# Patient Record
Sex: FEMALE | Race: WHITE | NOT HISPANIC OR LATINO | Employment: FULL TIME | URBAN - METROPOLITAN AREA
[De-identification: names, ages, dates, MRNs, and addresses within clinical notes are randomized per-mention and may not be internally consistent; named-entity substitution may affect disease eponyms.]

---

## 2023-11-16 ENCOUNTER — CLINICAL SUPPORT (OUTPATIENT)
Dept: PEDIATRICS | Facility: CLINIC | Age: 53
End: 2023-11-16
Payer: COMMERCIAL

## 2023-11-16 DIAGNOSIS — Z23 ENCOUNTER FOR IMMUNIZATION: ICD-10-CM

## 2023-11-16 PROCEDURE — 90686 IIV4 VACC NO PRSV 0.5 ML IM: CPT | Performed by: PEDIATRICS

## 2023-11-16 PROCEDURE — 91320 SARSCV2 VAC 30MCG TRS-SUC IM: CPT | Performed by: PEDIATRICS

## 2023-11-16 PROCEDURE — 90480 ADMN SARSCOV2 VAC 1/ONLY CMP: CPT | Performed by: PEDIATRICS

## 2023-11-16 PROCEDURE — 90471 IMMUNIZATION ADMIN: CPT | Performed by: PEDIATRICS

## 2023-11-16 NOTE — PROGRESS NOTES
Has the patient already received the influenza vaccine this season?  NO    Is the patient LESS than 6 months in age?  NO    Does the patient have an allergy to the influenza vaccine?  NO    Has the patient received a solid organ transplant in the past 3 months?  NO    Has the patient had anaphylaxis to gelatin or eggs?  NO    Does the patient have an allergy to Gentamicin?  NO    Has the patient been diagnosed with Guillain-[place] within 6 weeks after a previous flu vaccine?  NO

## 2023-12-27 NOTE — PROGRESS NOTES
Kenyetta Diaz female   1970 53 y.o.   [id number]           HPI  Kenyetta Diaz is a 53 y.o.   referred to the high risk breast clinic by Linwood Thibodeaux. She has been followed short term for calcifications now deemed stable. She denies breast surgery or biopsy. 2019 genetics tested negative.     She is dealing with incontinence issue and considering surgery vs. PT. She has one episode of PMB and has call out to Dr Esme Bell ([facility]).      She has family history of breast cancer in mother age 44, uterine cancer age 70,  of breast mets age 75. Her mother took KETAN while pregnant with her. Her maternal aunt had ovarian cancer age 36, BRCA negative. Her brother tested BRCA negative. There is no Ashkenazi Scientology ancestry.       BREAST IMAGIN2022 screening mammogram BI-RADS Category 1. 2019 fast MRI BI-RADS Category 1.      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   HENT:  Negative for ear pain, hearing loss, nosebleeds, sore throat and trouble swallowing.    Eyes:  Negative for discharge, itching and visual disturbance.   Respiratory:  Negative for cough, chest tightness and shortness of breath.    Cardiovascular:  Negative for chest pain, palpitations and leg swelling.   Breast: as indicated in HPI  Gastrointestinal:  Negative for abdominal pain, constipation, diarrhea and nausea.   Endocrine: Negative for cold intolerance and heat  intolerance.   Genitourinary:  Negative for dysuria, frequency, hematuria, pelvic pain and vaginal bleeding.   Musculoskeletal:  Negative for arthralgias, back pain, gait problem, joint swelling and myalgias.   Skin:  Negative for color change and rash.   Allergic/Immunologic: Negative for environmental allergies and food allergies.   Neurological:  Negative for dizziness, tremors, speech difficulty, weakness, numbness and headaches.   Hematological:  Does not bruise/bleed easily.   Psychiatric/Behavioral:  Negative for agitation, dysphoric mood and sleep disturbance. The patient is not nervous/anxious.         MEDICATIONS  Current Outpatient Medications   Medication Instructions    docusate sodium (COLACE ORAL) oral    fluticasone (Flonase) 50 mcg/actuation nasal spray 1 spray, Each Nostril, Daily, Shake gently. Before first use, prime pump. After use, clean tip and replace cap.    fluticasone propion/salmeterol (ADVAIR HFA INHL) inhalation    polyethylene glycol (GLYCOLAX, MIRALAX) 17 g, oral, Daily        ALLERGIES  Allergies   Allergen Reactions    Metrogel [Metronidazole] Itching        SOCIAL HISTORY    No family history on file.      Social History     Tobacco Use    Smoking status: Never    Smokeless tobacco: Never   Substance Use Topics    Alcohol use: Not on file        VITALS  Vitals:    01/02/24 0840   BP: 113/74   Pulse: 66        PHYSICAL EXAM  Patient is alert and oriented x3, with appropriate mood. The gait is steady and hand grasps are equal. Sclera clear. The breasts are nearly symmetrical. The tissue is soft without palpable abnormalities, discrete nodules or masses. The skin and nipples appear normal. There is no cervical, supraclavicular, or axillary lymphadenopathy palpable. Heart rate and rhythm normal, S1 and S2 appreciated. The lungs are clear bilaterally. Abdomen is soft & non-tender.    Physical Exam     IMAGING      Time was spent viewing digital images of the radiology testing with the  patient. I explained the results in depth, along with suggested explanation for follow up recommendations based on the testing results.          ORDERS  Orders Placed This Encounter   Procedures    BI mammo bilateral screening tomosynthesis     Standing Status:   Future     Standing Expiration Date:   2/27/2025     Order Specific Question:   Perform a breast ultrasound if clinically indicated by Radiologist?     Answer:   Yes     Order Specific Question:   Previous Mamm performed at  location?     Answer:   Yes     Order Specific Question:   Reason for exam:     Answer:   high risk     Order Specific Question:   Radiologist to Determine Optimal Study     Answer:   Yes     Order Specific Question:   Release result to [employer]     Answer:   Immediate     Order Specific Question:   Is this exam part of a Research Study? If Yes, link this order to the research study     Answer:   No     Order Specific Question:   Is the patient pregnant?     Answer:   No    MR breast bilateral w IV contrast fast screening self pay     Standing Status:   Future     Standing Expiration Date:   1/2/2025     Order Specific Question:   Reason for exam:     Answer:   high risk     Order Specific Question:   Is the patient pregnant or breast feeding?     Answer:   No     Order Specific Question:   What is the patient's sedation requirement?     Answer:   No Sedation     Order Specific Question:   Does the patient have a Cochlear Implant, Pacemaker, Defibrillator, Pacing Wire, Brain Aneurysm Clip, Implanted Nerve or Bone Graft Simulator, Implanted Breast Tissue Expander, Glucose Monitor or Neulasta Device?     Answer:   No     Order Specific Question:   Radiologist to Determine Optimal Study     Answer:   No     Order Specific Question:   Release result to [employer]     Answer:   Immediate           ASSESSMENT/PLAN  1. Breast cancer screening, high risk patient  Clinic Appointment Request    MR breast bilateral w IV contrast fast screening self  pay      2. Healthcare maintenance  BI mammo bilateral screening tomosynthesis           Follow up in about 1 year (around 1/2/2025), or with clinic screening mammogram.  HIGH RISK PLAN  Yearly mammogram with digital breast tomosynthesis  Twice yearly clinical breast examinations  Breast MRI (to schedule call [phone])  Monthly self breast examinations &/or regular self breast awareness  Vitamin D3 2000 IU/daily (over the counter) unless your PCP recommends you take a specific dose  Exercise 3-4 times per week for 45-60 minutes  Limit alcohol to 3-4 drinks per week if you are menopausal  Eat healthy low-fat diet with lots of vegetable & fruits  Risk models indicate personal risk of breast cancer in the next 5 years and lifetime (age 85-90):  Breast Cancer Risk Assessment Tool (Ngoc): 5-year risk 2% (average 1.4%), lifetime risk 15.1% (average 10.6%).   Stephanie: 5-year risk 3.2% (average 1.3%), lifetime risk 20.1%, (average 8.8%)    She is eligible for endocrine therapy with Tamoxifen, and also Raloxifene or Aromatase inhibitor if/when menopausal. Endocrine therapy reduces lifetime risk of breast cancer by 50% when taken for 5 years. There is an alternative low dose Tamoxifen which can be taken for only 3 years.       Yissel Dunbar, CORETTA-[facility]

## 2024-01-02 ENCOUNTER — ANCILLARY PROCEDURE (OUTPATIENT)
Dept: RADIOLOGY | Facility: CLINIC | Age: 54
End: 2024-01-02
Payer: COMMERCIAL

## 2024-01-02 ENCOUNTER — OFFICE VISIT (OUTPATIENT)
Dept: SURGICAL ONCOLOGY | Facility: CLINIC | Age: 54
End: 2024-01-02
Payer: COMMERCIAL

## 2024-01-02 VITALS — HEART RATE: 66 BPM | SYSTOLIC BLOOD PRESSURE: 113 MMHG | DIASTOLIC BLOOD PRESSURE: 74 MMHG

## 2024-01-02 DIAGNOSIS — Z00.00 HEALTHCARE MAINTENANCE: ICD-10-CM

## 2024-01-02 DIAGNOSIS — Z12.39 BREAST CANCER SCREENING, HIGH RISK PATIENT: Primary | ICD-10-CM

## 2024-01-02 DIAGNOSIS — Z12.39 ENCOUNTER FOR OTHER SCREENING FOR MALIGNANT NEOPLASM OF BREAST: ICD-10-CM

## 2024-01-02 PROCEDURE — 77067 SCR MAMMO BI INCL CAD: CPT | Mod: BILATERAL PROCEDURE | Performed by: RADIOLOGY

## 2024-01-02 PROCEDURE — 77067 SCR MAMMO BI INCL CAD: CPT

## 2024-01-02 PROCEDURE — 99214 OFFICE O/P EST MOD 30 MIN: CPT | Performed by: NURSE PRACTITIONER

## 2024-01-02 PROCEDURE — 77063 BREAST TOMOSYNTHESIS BI: CPT | Mod: BILATERAL PROCEDURE | Performed by: RADIOLOGY

## 2024-01-02 RX ORDER — POLYETHYLENE GLYCOL 3350 17 G/17G
17 POWDER, FOR SOLUTION ORAL DAILY
COMMUNITY

## 2024-01-02 RX ORDER — FLUTICASONE PROPIONATE 50 MCG
1 SPRAY, SUSPENSION (ML) NASAL DAILY
COMMUNITY

## 2024-01-02 ASSESSMENT — PAIN SCALES - GENERAL: PAINLEVEL: 0-NO PAIN

## 2024-01-17 PROBLEM — K58.0 IRRITABLE BOWEL SYNDROME WITH DIARRHEA: Status: ACTIVE | Noted: 2020-10-06

## 2024-01-17 PROBLEM — S09.90XA HEAD TRAUMA: Status: ACTIVE | Noted: 2024-01-17

## 2024-01-17 PROBLEM — R79.89 ABNORMAL LIVER FUNCTION TEST: Status: ACTIVE | Noted: 2022-12-29

## 2024-01-17 PROBLEM — L70.9 ACNE: Status: ACTIVE | Noted: 2024-01-17

## 2024-01-17 PROBLEM — K62.89 ANORECTAL PAIN: Status: ACTIVE | Noted: 2021-04-07

## 2024-01-17 PROBLEM — L71.9 ROSACEA: Status: ACTIVE | Noted: 2018-05-10

## 2024-01-17 PROBLEM — R22.0 NASAL SWELLING: Status: ACTIVE | Noted: 2024-01-17

## 2024-01-17 PROBLEM — H53.021 REFRACTIVE AMBLYOPIA OF RIGHT EYE: Status: ACTIVE | Noted: 2020-07-29

## 2024-01-17 PROBLEM — E78.00 PURE HYPERCHOLESTEROLEMIA: Status: ACTIVE | Noted: 2020-10-23

## 2024-01-17 PROBLEM — S05.00XA CORNEAL ABRASION: Status: ACTIVE | Noted: 2024-01-17

## 2024-01-17 PROBLEM — H17.9 CORNEAL SCARS, BOTH EYES: Status: ACTIVE | Noted: 2020-07-29

## 2024-01-17 PROBLEM — G89.29 CHRONIC ABDOMINAL PAIN: Status: ACTIVE | Noted: 2018-05-10

## 2024-01-17 PROBLEM — K64.9 HEMORRHOIDS: Status: ACTIVE | Noted: 2024-01-17

## 2024-01-17 PROBLEM — M79.10 MYALGIA: Status: ACTIVE | Noted: 2024-01-17

## 2024-01-17 PROBLEM — L98.9 INFLAMMATORY DERMATOSIS: Status: ACTIVE | Noted: 2024-01-17

## 2024-01-17 PROBLEM — H18.831 RECURRENT EROSION OF RIGHT CORNEA: Status: ACTIVE | Noted: 2020-07-29

## 2024-01-17 PROBLEM — Z86.79 HISTORY OF CARDIAC ARRHYTHMIA: Status: ACTIVE | Noted: 2024-01-17

## 2024-01-17 PROBLEM — R31.29 MICROSCOPIC HEMATURIA: Status: ACTIVE | Noted: 2024-01-17

## 2024-01-17 PROBLEM — I47.10 PAROXYSMAL SVT (SUPRAVENTRICULAR TACHYCARDIA) (CMS-HCC): Status: ACTIVE | Noted: 2024-01-17

## 2024-01-17 PROBLEM — R32 URINARY INCONTINENCE: Status: ACTIVE | Noted: 2024-01-17

## 2024-01-17 PROBLEM — K59.02 ANISMUS: Status: ACTIVE | Noted: 2024-01-17

## 2024-01-17 PROBLEM — K29.70 GASTRITIS: Status: ACTIVE | Noted: 2024-01-17

## 2024-01-17 PROBLEM — R92.30 DENSE BREAST TISSUE: Status: ACTIVE | Noted: 2024-01-17

## 2024-01-17 PROBLEM — R10.9 CHRONIC ABDOMINAL PAIN: Status: ACTIVE | Noted: 2018-05-10

## 2024-01-17 PROBLEM — L30.9 DERMATITIS: Status: ACTIVE | Noted: 2024-01-17

## 2024-01-17 PROBLEM — B35.1 ONYCHOMYCOSIS: Status: ACTIVE | Noted: 2019-06-12

## 2024-01-17 PROBLEM — E55.9 VITAMIN D DEFICIENCY: Status: ACTIVE | Noted: 2020-10-06

## 2024-01-17 PROBLEM — R76.8 ANA POSITIVE: Status: ACTIVE | Noted: 2018-05-10

## 2024-01-17 PROBLEM — N39.3 STRESS INCONTINENCE, FEMALE: Status: ACTIVE | Noted: 2018-05-23

## 2024-01-17 PROBLEM — K29.50 ANTRAL GASTRITIS: Status: ACTIVE | Noted: 2024-01-17

## 2024-01-17 PROBLEM — F41.9 ANXIETY: Status: ACTIVE | Noted: 2018-05-09

## 2024-01-17 PROBLEM — H18.332: Status: ACTIVE | Noted: 2020-07-29

## 2024-01-17 PROBLEM — R51.9 HEADACHE: Status: ACTIVE | Noted: 2024-01-17

## 2024-01-17 PROBLEM — J02.9 SORE THROAT: Status: ACTIVE | Noted: 2024-01-17

## 2024-01-17 PROBLEM — H52.223 REGULAR ASTIGMATISM OF BOTH EYES: Status: ACTIVE | Noted: 2020-07-29

## 2024-01-17 PROBLEM — J01.00 ACUTE NON-RECURRENT MAXILLARY SINUSITIS: Status: ACTIVE | Noted: 2018-03-30

## 2024-01-17 PROBLEM — M25.50 ARTHRALGIA OF MULTIPLE SITES: Status: ACTIVE | Noted: 2024-01-17

## 2024-01-17 PROBLEM — R07.89 CHEST WALL PAIN: Status: ACTIVE | Noted: 2024-01-17

## 2024-01-17 RX ORDER — METHYLPREDNISOLONE 4 MG/1
TABLET ORAL
COMMUNITY
Start: 2023-11-08

## 2024-01-17 RX ORDER — AZELASTINE 1 MG/ML
2 SPRAY, METERED NASAL 2 TIMES DAILY
COMMUNITY
Start: 2023-07-05

## 2024-01-17 RX ORDER — IPRATROPIUM BROMIDE 42 UG/1
SPRAY, METERED NASAL
COMMUNITY
Start: 2023-07-14

## 2024-01-17 RX ORDER — MONTELUKAST SODIUM 10 MG/1
10 TABLET ORAL DAILY
COMMUNITY
Start: 2023-07-26

## 2024-01-17 RX ORDER — MISOPROSTOL 200 UG/1
TABLET ORAL
COMMUNITY
Start: 2024-01-02

## 2024-01-17 RX ORDER — TRETINOIN 0.25 MG/G
CREAM TOPICAL
COMMUNITY
Start: 2024-01-03 | End: 2025-01-03

## 2024-01-17 RX ORDER — ERGOCALCIFEROL 1.25 MG/1
1 CAPSULE ORAL
COMMUNITY

## 2024-01-17 RX ORDER — ESTRADIOL 0.1 MG/G
1 CREAM VAGINAL 2 TIMES WEEKLY
COMMUNITY
Start: 2023-12-21 | End: 2025-08-07

## 2024-01-17 RX ORDER — PANTOPRAZOLE SODIUM 40 MG/1
40 TABLET, DELAYED RELEASE ORAL
COMMUNITY
Start: 2023-07-31

## 2024-01-17 RX ORDER — CHOLECALCIFEROL (VITAMIN D3) 50 MCG
TABLET ORAL EVERY 24 HOURS
COMMUNITY

## 2024-01-17 RX ORDER — BUDESONIDE 0.5 MG/2ML
INHALANT ORAL
COMMUNITY
Start: 2023-12-12

## 2024-06-03 ENCOUNTER — TELEPHONE (OUTPATIENT)
Dept: SURGICAL ONCOLOGY | Facility: CLINIC | Age: 54
End: 2024-06-03
Payer: COMMERCIAL

## 2024-06-03 ENCOUNTER — HOSPITAL ENCOUNTER (OUTPATIENT)
Dept: RADIOLOGY | Facility: HOSPITAL | Age: 54
Discharge: HOME | End: 2024-06-03

## 2024-06-03 DIAGNOSIS — Z12.39 BREAST CANCER SCREENING, HIGH RISK PATIENT: ICD-10-CM

## 2024-06-03 PROCEDURE — 6100000003 BI MR BREAST BILATERAL WITH CONTRAST FAST SCREENING SELF PAY

## 2024-06-03 PROCEDURE — 2550000001 HC RX 255 CONTRASTS: Performed by: NURSE PRACTITIONER

## 2024-06-03 PROCEDURE — A9575 INJ GADOTERATE MEGLUMI 0.1ML: HCPCS | Performed by: NURSE PRACTITIONER

## 2024-06-03 RX ORDER — GADOTERATE MEGLUMINE 376.9 MG/ML
12 INJECTION INTRAVENOUS
Status: COMPLETED | OUTPATIENT
Start: 2024-06-03 | End: 2024-06-03

## 2024-06-03 RX ADMIN — GADOTERATE MEGLUMINE 12 ML: 376.9 INJECTION INTRAVENOUS at 08:34

## 2024-09-11 ENCOUNTER — OFFICE VISIT (OUTPATIENT)
Dept: PEDIATRICS | Facility: CLINIC | Age: 54
End: 2024-09-11
Payer: COMMERCIAL

## 2024-09-11 DIAGNOSIS — Z23 ENCOUNTER FOR IMMUNIZATION: ICD-10-CM

## 2024-09-11 PROCEDURE — 90656 IIV3 VACC NO PRSV 0.5 ML IM: CPT | Performed by: PEDIATRICS

## 2024-09-11 PROCEDURE — 90471 IMMUNIZATION ADMIN: CPT | Performed by: PEDIATRICS

## 2025-01-02 NOTE — PROGRESS NOTES
Kenyetta Diaz female   1970 54 y.o.   [id number]      Chief Complaint    Follow-up          HPI  Kenyetta Diaz is a 54 y.o.   [facility]. She denies breast surgery or biopsy. 2019 genetics tested negative.     She is dealing with incontinence issue and considering surgery vs. PT. She has one episode of PMB with negative endometrial biopsy 2024 ([facility]).  She has family history of breast cancer in mother age 44, uterine cancer age 70,  of breast mets age 75. Her mother took KETAN while pregnant with her. Her maternal aunt had ovarian cancer age 36, BRCA negative. Her brother tested BRCA negative. There is no Ashkenazi Denominational ancestry.     BREAST IMAGING: 10/14/2024 screening mammogram BI-RADS Category 1. 2024 fast MRI BI-RADS Category 1.      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 +fatigue  HENT:  Negative for ear pain, hearing loss, nosebleeds, sore throat and trouble swallowing.    Eyes:  Negative for discharge, itching and visual disturbance.   Respiratory:  Negative for chest tightness and shortness of breath.  +cough  Cardiovascular:  Negative for chest pain, palpitations and leg swelling.   Breast: as indicated in HPI  Gastrointestinal:  Negative for abdominal pain, constipation, diarrhea and nausea.   Endocrine: Negative for cold intolerance and heat intolerance.    Genitourinary:  Negative for dysuria, frequency, hematuria, pelvic pain and vaginal bleeding. +incontinence  Musculoskeletal:  Negative for back pain, gait problem, joint swelling and myalgias. +joint pain  Skin:  Negative for color change and rash.   Allergic/Immunologic: Negative for environmental allergies and food allergies.   Neurological:  Negative for dizziness, tremors, speech difficulty, weakness, numbness and headaches.   Hematological:  Does not bruise/bleed easily.   Psychiatric/Behavioral:  Negative for agitation, dysphoric mood and sleep disturbance. The patient is not nervous/anxious.         MEDICATIONS  Current Outpatient Medications   Medication Instructions    azelastine (Astelin) 137 mcg (0.1 %) nasal spray 2 sprays, 2 times daily    budesonide (Pulmicort) 0.5 mg/2 mL nebulizer solution 2 MILLILITER(S) DAILY IN SINUS RINSE    cholecalciferol (Vitamin D-3) 50 MCG (2000 UT) tablet Every 24 hours    docusate sodium (COLACE ORAL) Take by mouth.    ergocalciferol (Vitamin D-2) 1.25 MG (74292 UT) capsule 1 capsule, Once Weekly    estradiol (ESTRACE) 1 g, 2 times weekly    fluticasone propion/salmeterol (ADVAIR HFA INHL) Inhale.    ipratropium (Atrovent) 42 mcg (0.06 %) nasal spray INHALE 2 PUFFS NASALLY 3 TIMES A DAY AS NEEDED    miSOPROStoL (Cytotec) 200 mcg tablet 1 tablets 2 days prior to procedure at bedtime and then take 1 tabs the day before the procedure at bedtime.    polyethylene glycol (GLYCOLAX, MIRALAX) 17 g, Daily        ALLERGIES  Allergies   Allergen Reactions    Metrogel [Metronidazole] Itching    Nitroimidazoles Itching, Rash and Unknown        SOCIAL HISTORY    Family History   Problem Relation Name Age of Onset    Breast cancer Mother 45     Other (bladder cancer) Brother      Colon cancer Maternal Grandfather      Ovarian cancer Mother's Sister 36          Social History     Tobacco Use    Smoking status: Never    Smokeless tobacco: Never   Substance Use Topics    Alcohol use: Not  on file        VITALS  Vitals:    01/03/25 0759   BP: 105/65   Pulse: 68   Temp: 36.8 °C (98.2 °F)          PHYSICAL EXAM  Patient is alert and oriented x3, with appropriate mood. The gait is steady and hand grasps are equal. Sclera clear. The breasts are nearly symmetrical. The tissue is soft without palpable abnormalities, discrete nodules or masses. The skin and nipples appear normal. There is no cervical, supraclavicular, or axillary lymphadenopathy palpable. Heart rate and rhythm normal, S1 and S2 appreciated. The lungs are clear bilaterally. Abdomen is soft & non-tender.    Physical Exam     IMAGING      Time was spent viewing digital images of the radiology testing with the patient.     RISK PROFILE      ORDERS  Orders Placed This Encounter   Procedures    BI mammo bilateral screening tomosynthesis     Standing Status:   Future     Standing Expiration Date:   3/2/2026     Order Specific Question:   Perform a breast ultrasound if clinically indicated by Radiologist?     Answer:   Yes     Order Specific Question:   Previous Mamm performed at  location?     Answer:   Yes     Order Specific Question:   Reason for exam:     Answer:   .     Order Specific Question:   Radiologist to Determine Optimal Study     Answer:   Yes     Order Specific Question:   Release result to [employer]     Answer:   Immediate     Order Specific Question:   Is this exam part of a Research Study? If Yes, link this order to the research study     Answer:   No     Order Specific Question:   Is the patient pregnant?     Answer:   No    MR breast bilateral w IV contrast fast screening self pay     Standing Status:   Future     Standing Expiration Date:   1/2/2026     Order Specific Question:   Reason for exam:     Answer:   high risk surveillance care, dense breast tissue, lifetime risk > 20%     Order Specific Question:   Is the patient pregnant or breast feeding?     Answer:   No     Order Specific Question:   Does the patient have a  Cochlear Implant, Brain Aneurysm Clip, Implanted Nerve or Bone Graft Stimulator, Implanted Breast Tissue Expander, Glucose Monitor or Neulasta Device?     Answer:   No     Order Specific Question:   Radiologist to Determine Optimal Study     Answer:   Yes     Order Specific Question:   Release result to [employer]     Answer:   Immediate     Order Specific Question:   Is this exam part of a Research Study? If Yes, link this order to the research study     Answer:   No           ASSESSMENT/PLAN  1. Encounter for screening mammogram for malignant neoplasm of breast  BI mammo bilateral screening tomosynthesis      2. Breast cancer screening, high risk patient  Clinic Appointment Request    MR breast bilateral w IV contrast fast screening self pay    Clinic Appointment Request             Follow up in about 1 year (around 1/3/2026) for with or after recommended imaging.  HIGH RISK PLAN  Yearly mammogram with digital breast tomosynthesis  Twice yearly clinical breast examinations  Breast MRI (to schedule call [phone]) May 2025  Monthly self breast examinations &/or regular self breast awareness  Vitamin D3 2000 IU/daily (over the counter) unless your PCP recommends you take a specific dose  Exercise 3-4 times per week for 45-60 minutes  Limit alcohol to 3-4 drinks per week if you are menopausal  Eat healthy low-fat diet with lots of vegetable & fruits  Risk models indicate personal risk of breast cancer in the next 5 years and lifetime (age 85-90):  Stephanie: 5-year risk 3.2% (average 1.4%), lifetime risk 19.5%, (average 8.6%)    She is eligible for endocrine therapy with Tamoxifen, and also Raloxifene or Aromatase inhibitor if/when menopausal. Endocrine therapy reduces lifetime risk of breast cancer by 50% when taken for 5 years. There is an alternative low dose Tamoxifen which can be taken for only 3 years.       Yissel Dunbar, APRN-[facility]

## 2025-01-03 ENCOUNTER — OFFICE VISIT (OUTPATIENT)
Dept: SURGICAL ONCOLOGY | Facility: CLINIC | Age: 55
End: 2025-01-03
Payer: COMMERCIAL

## 2025-01-03 ENCOUNTER — HOSPITAL ENCOUNTER (OUTPATIENT)
Dept: RADIOLOGY | Facility: CLINIC | Age: 55
Discharge: HOME | End: 2025-01-03
Payer: COMMERCIAL

## 2025-01-03 VITALS — HEIGHT: 66 IN | WEIGHT: 151.01 LBS | BODY MASS INDEX: 24.27 KG/M2

## 2025-01-03 VITALS
TEMPERATURE: 98.2 F | DIASTOLIC BLOOD PRESSURE: 65 MMHG | SYSTOLIC BLOOD PRESSURE: 105 MMHG | BODY MASS INDEX: 21.8 KG/M2 | WEIGHT: 135 LBS | HEART RATE: 68 BPM

## 2025-01-03 DIAGNOSIS — Z12.31 ENCOUNTER FOR SCREENING MAMMOGRAM FOR MALIGNANT NEOPLASM OF BREAST: Primary | ICD-10-CM

## 2025-01-03 DIAGNOSIS — Z00.00 HEALTHCARE MAINTENANCE: ICD-10-CM

## 2025-01-03 DIAGNOSIS — Z12.39 BREAST CANCER SCREENING, HIGH RISK PATIENT: ICD-10-CM

## 2025-01-03 PROCEDURE — 99214 OFFICE O/P EST MOD 30 MIN: CPT | Performed by: NURSE PRACTITIONER

## 2025-01-03 PROCEDURE — 77067 SCR MAMMO BI INCL CAD: CPT

## 2025-01-03 ASSESSMENT — PAIN SCALES - GENERAL: PAINLEVEL_OUTOF10: 0-NO PAIN

## 2025-03-03 ENCOUNTER — CLINICAL SUPPORT (OUTPATIENT)
Dept: EMERGENCY MEDICINE | Facility: HOSPITAL | Age: 55
End: 2025-03-03
Payer: COMMERCIAL

## 2025-03-03 ENCOUNTER — HOSPITAL ENCOUNTER (EMERGENCY)
Facility: HOSPITAL | Age: 55
Discharge: HOME | End: 2025-03-03
Attending: EMERGENCY MEDICINE
Payer: COMMERCIAL

## 2025-03-03 VITALS
HEIGHT: 66 IN | TEMPERATURE: 98.8 F | OXYGEN SATURATION: 98 % | WEIGHT: 135 LBS | BODY MASS INDEX: 21.69 KG/M2 | SYSTOLIC BLOOD PRESSURE: 114 MMHG | DIASTOLIC BLOOD PRESSURE: 75 MMHG | RESPIRATION RATE: 16 BRPM | HEART RATE: 82 BPM

## 2025-03-03 DIAGNOSIS — M94.0 COSTOCHONDRITIS: Primary | ICD-10-CM

## 2025-03-03 PROCEDURE — 99284 EMERGENCY DEPT VISIT MOD MDM: CPT | Performed by: EMERGENCY MEDICINE

## 2025-03-03 PROCEDURE — 2500000005 HC RX 250 GENERAL PHARMACY W/O HCPCS: Performed by: STUDENT IN AN ORGANIZED HEALTH CARE EDUCATION/TRAINING PROGRAM

## 2025-03-03 PROCEDURE — 93005 ELECTROCARDIOGRAM TRACING: CPT

## 2025-03-03 PROCEDURE — 93010 ELECTROCARDIOGRAM REPORT: CPT | Performed by: EMERGENCY MEDICINE

## 2025-03-03 PROCEDURE — 99283 EMERGENCY DEPT VISIT LOW MDM: CPT | Performed by: EMERGENCY MEDICINE

## 2025-03-03 RX ORDER — DICLOFENAC SODIUM 10 MG/G
4 GEL TOPICAL 4 TIMES DAILY
Qty: 100 G | Refills: 0 | Status: SHIPPED | OUTPATIENT
Start: 2025-03-03

## 2025-03-03 RX ORDER — FLUTICASONE PROPIONATE 50 MCG
1 SPRAY, SUSPENSION (ML) NASAL DAILY
Qty: 16 G | Refills: 0 | Status: SHIPPED | OUTPATIENT
Start: 2025-03-03 | End: 2025-04-02

## 2025-03-03 RX ORDER — CETIRIZINE HYDROCHLORIDE 10 MG/1
10 TABLET ORAL DAILY
Qty: 30 TABLET | Refills: 0 | Status: SHIPPED | OUTPATIENT
Start: 2025-03-03 | End: 2026-03-03

## 2025-03-03 RX ORDER — LIDOCAINE 50 MG/G
1 PATCH TOPICAL DAILY
Qty: 5 PATCH | Refills: 0 | Status: SHIPPED | OUTPATIENT
Start: 2025-03-03

## 2025-03-03 RX ORDER — LIDOCAINE 560 MG/1
1 PATCH PERCUTANEOUS; TOPICAL; TRANSDERMAL DAILY
Status: DISCONTINUED | OUTPATIENT
Start: 2025-03-03 | End: 2025-03-03 | Stop reason: HOSPADM

## 2025-03-03 RX ADMIN — LIDOCAINE PAIN RELIEF 1 PATCH: 560 PATCH TOPICAL at 19:24

## 2025-03-03 ASSESSMENT — COLUMBIA-SUICIDE SEVERITY RATING SCALE - C-SSRS
2. HAVE YOU ACTUALLY HAD ANY THOUGHTS OF KILLING YOURSELF?: NO
1. IN THE PAST MONTH, HAVE YOU WISHED YOU WERE DEAD OR WISHED YOU COULD GO TO SLEEP AND NOT WAKE UP?: NO
6. HAVE YOU EVER DONE ANYTHING, STARTED TO DO ANYTHING, OR PREPARED TO DO ANYTHING TO END YOUR LIFE?: NO

## 2025-03-03 ASSESSMENT — PAIN SCALES - GENERAL
PAINLEVEL_OUTOF10: 3
PAINLEVEL_OUTOF10: 8

## 2025-03-03 ASSESSMENT — PAIN - FUNCTIONAL ASSESSMENT: PAIN_FUNCTIONAL_ASSESSMENT: 0-10

## 2025-03-03 NOTE — ED TRIAGE NOTES
Patient states that she is having SOB and cough and pain when she inhales. Patient has been having cough x2 years. Patient states that sob that started today. Patient recently diagnosed with sinus infection. Patient denies PMH. Patient states that she went to a pulmonologist for chronic cough

## 2025-03-03 NOTE — ED PROVIDER NOTES
Emergency Department Provider Note        History of Present Illness     History provided by: Patient  Limitations to History: None  External Records Reviewed with Brief Summary:  Multiple outpatient notes from pulmonology, PCP, as well as radiology reviewing.  Patient has history of recurrent lung infections as well as sinusitis.  Is being referred to allergy for further evaluation.  Multiple messages today to outpatient clinic unable to make an appointment    HPI:  Kenyetta Diaz is a 54 y.o. female presenting to the emergency department for concerns of a cough and focal right rib pain.  Patient noticed she coughed very hard yesterday and since that time has had notable right rib pain.  It hurts when she breathes in deeply.  Normal breathing does not bother her as much.  It is very tender to palpation.    Physical Exam   Triage vitals:  T 37.1 °C (98.8 °F)  HR 81  /65  RR 16  O2 95 % None (Room air)    Physical Exam  Constitutional:       General: She is not in acute distress.     Appearance: Normal appearance. She is not ill-appearing.   HENT:      Head: Normocephalic and atraumatic.      Right Ear: External ear normal.      Left Ear: External ear normal.      Mouth/Throat:      Mouth: Mucous membranes are moist.   Eyes:      General:         Right eye: No discharge.         Left eye: No discharge.   Cardiovascular:      Rate and Rhythm: Normal rate.   Pulmonary:      Effort: Pulmonary effort is normal. No respiratory distress.   Musculoskeletal:         General: Tenderness (Tenderness to palpation over the right rib at approximately the level of the seventh or eighth rib.  Focal tenderness that does not spread out) present. Normal range of motion.   Skin:     General: Skin is warm and dry.      Coloration: Skin is not jaundiced.   Neurological:      Mental Status: She is alert and oriented to person, place, and time.   Psychiatric:         Mood and Affect: Mood normal.         Behavior: Behavior normal.           Medical Decision Making & ED Course   Medical Decision Makin y.o. female  Presenting as per [facility], differential is broad and includes but not limited to costochondritis, chronic rhinosinusitis, viral syndrome, muscle strain, bronchitis.   As a result, workup was considered including imaging, blood work however patient was very hemodynamically stable, and with regards to risk versus benefit there did not appear to be a clinical question that would be identified with this at the time so through shared decision making it was ultimately deferred.  Additionally after discussed with the patient, it was agreeable that patient would discharge home with new prescription for lidocaine patch which 1 was also applied in the emergency department, Voltaren gel, as well as Zyrtec.  Prescription of Flonase was offered however patient would like to continue taking her current nasal spray instead of that.  Patient will continue following up with her specialists at this time.  Additionally patient given incentive spirometer and instructed to utilize it to prevent development of pneumonia due to pain on deep respiration to which patient was agreeable.  ----    Differential diagnoses considered include but are not limited to: As per [facility]    Chronic conditions affecting the patient's care: As documented above in [facility]    Care Considerations: As documented above in [facility]    ED Course:  Diagnoses as of 25   Costochondritis     Disposition   As a result of the work-up, the patient was discharged home.  she was informed of her diagnosis and instructed to come back with any concerns or worsening of condition.  she and was agreeable to the plan as discussed above.  she was given the opportunity to ask questions.  All of the patient's questions were answered.    Procedures   Procedures    Scott Nuñez DO  Emergency Medicine      Scott Nuñez DO  Resident  25

## 2025-03-04 LAB
ATRIAL RATE: 81 BPM
P AXIS: 82 DEGREES
P OFFSET: 208 MS
P ONSET: 157 MS
PR INTERVAL: 132 MS
Q ONSET: 223 MS
QRS COUNT: 14 BEATS
QRS DURATION: 68 MS
QT INTERVAL: 348 MS
QTC CALCULATION(BAZETT): 404 MS
QTC FREDERICIA: 384 MS
R AXIS: 89 DEGREES
T AXIS: 70 DEGREES
T OFFSET: 397 MS
VENTRICULAR RATE: 81 BPM

## 2025-03-04 NOTE — DISCHARGE INSTRUCTIONS
Discharged home with reassurance.  Please use Voltaren gel for pain relief, he can also use lidocaine patches as well however that is mainly pain control would not address the underlying inflammation.  Please take Flonase as prescribed to help with the overall congestion and sinus pressure.  Zyrtec as well, please use your incentive spirometer to prevent issues with lung filling and to prevent the possibility of infection in the setting of having pain on deep respiration due to your rib pain

## 2025-03-10 DIAGNOSIS — J32.9 RECURRENT SINUSITIS: Primary | ICD-10-CM

## 2025-03-14 LAB
S PN DA SERO 19F IGG SER-MCNC: 0.4 UG/ML
S PNEUM DA 1 IGG SER-MCNC: <0.3 UG/ML
S PNEUM DA 10A IGG SER-MCNC: 0.6 UG/ML
S PNEUM DA 11A IGG SER-MCNC: 3.1 UG/ML
S PNEUM DA 12F IGG SER-MCNC: <0.3 UG/ML
S PNEUM DA 14 IGG SER-MCNC: 8.3
S PNEUM DA 15B IGG SER-MCNC: 2.6 UG/ML
S PNEUM DA 17F IGG SER-MCNC: <0.3 UG/ML
S PNEUM DA 18C IGG SER-MCNC: <0.3
S PNEUM DA 19A IGG SER-MCNC: 1.4 UG/ML
S PNEUM DA 2 IGG SER-MCNC: <0.3 UG/ML
S PNEUM DA 20A IGG SER-MCNC: 0.7 UG/ML
S PNEUM DA 22F IGG SER-MCNC: <0.3 UG/ML
S PNEUM DA 23F IGG SER-MCNC: 0.3 UG/ML
S PNEUM DA 3 IGG SER-MCNC: 0.5 UG/ML
S PNEUM DA 33F IGG SER-MCNC: 0.5 UG/ML
S PNEUM DA 4 IGG SER-MCNC: <0.3 UG/ML
S PNEUM DA 5 IGG SER-MCNC: <0.3 UG/ML
S PNEUM DA 6B IGG SER-MCNC: <0.3 UG/ML
S PNEUM DA 7F IGG SER-MCNC: <0.3 UG/ML
S PNEUM DA 8 IGG SER-MCNC: 0.4 UG/ML
S PNEUM DA 9N IGG SER-MCNC: 2.4 UG/ML
S PNEUM DA 9V IGG SER-MCNC: 1.3 UG/ML

## 2025-03-18 ENCOUNTER — DOCUMENTATION (OUTPATIENT)
Dept: ALLERGY | Facility: CLINIC | Age: 55
End: 2025-03-18
Payer: COMMERCIAL

## 2025-03-18 DIAGNOSIS — B99.9 RECURRENT INFECTIONS: Primary | ICD-10-CM

## 2025-03-20 ENCOUNTER — DOCUMENTATION (OUTPATIENT)
Dept: RESPIRATORY THERAPY | Facility: HOSPITAL | Age: 55
End: 2025-03-20
Payer: COMMERCIAL

## 2025-03-20 ENCOUNTER — OFFICE VISIT (OUTPATIENT)
Dept: PULMONOLOGY | Facility: HOSPITAL | Age: 55
End: 2025-03-20
Payer: COMMERCIAL

## 2025-03-20 VITALS
HEART RATE: 71 BPM | DIASTOLIC BLOOD PRESSURE: 65 MMHG | SYSTOLIC BLOOD PRESSURE: 109 MMHG | WEIGHT: 136 LBS | OXYGEN SATURATION: 98 % | TEMPERATURE: 97 F | BODY MASS INDEX: 21.95 KG/M2

## 2025-03-20 DIAGNOSIS — J47.9 BRONCHIECTASIS WITHOUT COMPLICATION (MULTI): Primary | ICD-10-CM

## 2025-03-20 PROCEDURE — 99215 OFFICE O/P EST HI 40 MIN: CPT | Performed by: INTERNAL MEDICINE

## 2025-03-20 PROCEDURE — 1036F TOBACCO NON-USER: CPT | Performed by: INTERNAL MEDICINE

## 2025-03-20 PROCEDURE — 99205 OFFICE O/P NEW HI 60 MIN: CPT | Performed by: INTERNAL MEDICINE

## 2025-03-20 RX ORDER — METHYLPREDNISOLONE 4 MG/1
TABLET ORAL
COMMUNITY
Start: 2025-03-04

## 2025-03-20 RX ORDER — FLUOXETINE 10 MG/1
1 CAPSULE ORAL
COMMUNITY
Start: 2024-09-26

## 2025-03-20 RX ORDER — TRAMADOL HYDROCHLORIDE 50 MG/1
TABLET ORAL
COMMUNITY
Start: 2025-03-07

## 2025-03-20 RX ORDER — ESOMEPRAZOLE MAGNESIUM 40 MG/1
40 CAPSULE, DELAYED RELEASE ORAL
COMMUNITY
Start: 2024-05-14

## 2025-03-20 RX ORDER — AZITHROMYCIN 500 MG/1
1 TABLET, FILM COATED ORAL
COMMUNITY
Start: 2024-05-22

## 2025-03-20 RX ORDER — FLUTICASONE PROPIONATE AND SALMETEROL 250; 50 UG/1; UG/1
POWDER RESPIRATORY (INHALATION)
COMMUNITY
Start: 2024-08-26

## 2025-03-20 RX ORDER — AMOXICILLIN AND CLAVULANATE POTASSIUM 875; 125 MG/1; MG/1
1 TABLET, FILM COATED ORAL
COMMUNITY
Start: 2024-08-14

## 2025-03-20 RX ORDER — FLUOXETINE HYDROCHLORIDE 20 MG/1
1 CAPSULE ORAL
COMMUNITY
Start: 2025-02-19

## 2025-03-20 RX ORDER — PREDNISONE 10 MG/1
TABLET ORAL
COMMUNITY
Start: 2024-05-22

## 2025-03-20 RX ORDER — TIZANIDINE 4 MG/1
4 TABLET ORAL DAILY PRN
COMMUNITY
Start: 2024-04-21

## 2025-03-20 RX ORDER — KETOTIFEN FUMARATE 0.35 MG/ML
SOLUTION/ DROPS OPHTHALMIC
COMMUNITY
Start: 2024-06-15

## 2025-03-20 RX ORDER — ALBUTEROL SULFATE 90 UG/1
1-2 INHALANT RESPIRATORY (INHALATION) EVERY 4 HOURS PRN
COMMUNITY
Start: 2025-03-07

## 2025-03-20 RX ORDER — FLUOXETINE 10 MG/1
1 CAPSULE ORAL
COMMUNITY
Start: 2024-08-24

## 2025-03-20 RX ORDER — DOXYCYCLINE HYCLATE 100 MG
1 TABLET ORAL
COMMUNITY
Start: 2025-02-19

## 2025-03-20 RX ORDER — CODEINE PHOSPHATE AND GUAIFENESIN 10; 100 MG/5ML; MG/5ML
SOLUTION ORAL
COMMUNITY
Start: 2025-03-07

## 2025-03-20 RX ORDER — LEVOFLOXACIN 500 MG/1
1 TABLET, FILM COATED ORAL
COMMUNITY
Start: 2025-03-04

## 2025-03-20 RX ORDER — MUPIROCIN 20 MG/G
OINTMENT TOPICAL
COMMUNITY
Start: 2024-01-18

## 2025-03-20 RX ORDER — INHALER, ASSIST DEVICES
SPACER (EA) MISCELLANEOUS
COMMUNITY
Start: 2025-02-25

## 2025-03-20 RX ORDER — FLUTICASONE PROPIONATE 93 UG/1
1 SPRAY, METERED NASAL 2 TIMES DAILY
COMMUNITY
Start: 2024-10-28

## 2025-03-20 RX ORDER — BUDESONIDE, GLYCOPYRROLATE, AND FORMOTEROL FUMARATE 160; 9; 4.8 UG/1; UG/1; UG/1
2 AEROSOL, METERED RESPIRATORY (INHALATION) 2 TIMES DAILY
COMMUNITY
Start: 2025-02-25

## 2025-03-20 RX ORDER — DOXYCYCLINE 100 MG/1
100 CAPSULE ORAL 2 TIMES DAILY
COMMUNITY

## 2025-03-20 RX ORDER — CEFDINIR 300 MG/1
CAPSULE ORAL
COMMUNITY
Start: 2025-03-07

## 2025-03-20 RX ORDER — BENZONATATE 100 MG/1
100 CAPSULE ORAL
COMMUNITY
Start: 2024-09-10

## 2025-03-20 RX ORDER — MOMETASONE FUROATE AND FORMOTEROL FUMARATE DIHYDRATE 200; 5 UG/1; UG/1
2 AEROSOL RESPIRATORY (INHALATION) 2 TIMES DAILY
COMMUNITY
Start: 2025-02-25

## 2025-03-20 RX ORDER — ONDANSETRON 4 MG/1
4 TABLET, ORALLY DISINTEGRATING ORAL EVERY 8 HOURS PRN
COMMUNITY
Start: 2025-03-05

## 2025-03-20 RX ORDER — AMOXICILLIN 875 MG/1
1 TABLET, FILM COATED ORAL
COMMUNITY
Start: 2024-12-20

## 2025-03-20 ASSESSMENT — PAIN SCALES - GENERAL: PAINLEVEL_OUTOF10: 3

## 2025-03-20 NOTE — PROGRESS NOTES
Division of Pulmonary, Critical Care, and Sleep Medicine    Reason for the consultation     Kenyetta Diaz is a 55 y.o. female who presents to a [facility] for an evaluation for recurrent pneumonias.  I have independently interviewed and examined the patient in the office and reviewed available records.    Physician HPI (3/20/2025):    Kenyetta Diaz is a 55-year-old white female non-smoker with no exposure who has been suffering from recurrent sinusitis for the last 5 to 6 years.  She was evaluated by ENT on multiple occasions.  Over the last year patient had 2 pneumonias one in April 2024 treated as OP and a second one in March 2025 requiring admission and IV antibiotic.  Both pneumonias are in the right lower lobe.  A chest CT from May 2025 shows some mild groundglass nodules and TIB nodules in the right lower lobe and mild bronchiectasis.  These have gotten better on the July 2024 CT.  Her most recent chest CT from March 2025 shows right lower lobe infiltrate/consolidation consistent with her most recent pneumonia.  The patient was treated with azithromycin and ceftriaxone IV for 3 days and was discharged home on p.o. Omnicef and she completed an additional 4 days.  She feels much improved but she continues to have some cough productive of yellow sputum.  She also feels fatigued.  She also has right sided rib pain and back pain. She was recently evaluated by allergy immunology and she is having a immune deficiency workup.    Patient reports having an episode of severe pneumonia when she was a teenager for which she was home for about 1 month.  She was not hospitalized or treated at home.  She does not regularly exercise for the last 4 to 5 years..  She is mMRC 1.  She also was evaluated at the [facility] for cough and had a methacholine challenge test that was reportedly negative.  She was prescribed ICS/LABA in  variable forms.  She stopped using everything most recently  because she read the increased risk of pneumonia. With the use of ICS.  She only uses albuterol.  She suffersfrom GI issues/IBS.  She also suffers from reflux.  Outside of the pneumonia episode she denies any wheezing or chest tightness.    [facility]  Past Medical History:   Diagnosis Date    Acute streptococcal tonsillitis, unspecified 03/31/2019    Acute streptococcal tonsillitis    Mammographic microcalcification found on diagnostic imaging of breast 05/15/2018    Abnormal mammogram with microcalcification    Other conditions influencing health status     Gave birth to child recently    Other conditions influencing health status 03/23/2016    History of cough    Other specified symptoms and signs involving the digestive system and abdomen     Spastic pelvic floor syndrome    Pain in right lower leg 04/22/2016    Right calf pain    Personal history of other diseases of the circulatory system     History of supraventricular tachycardia    Personal history of other diseases of the female genital tract 01/08/2015    History of infertility    Personal history of other diseases of the nervous system and sense organs 02/02/2016    History of acute otitis media    Personal history of other diseases of the respiratory system 03/27/2018    History of acute sinusitis    Personal history of other diseases of the respiratory system 03/27/2018    History of acute bronchitis    Personal history of other diseases of the respiratory system 02/22/2016    History of acute sinusitis    Unspecified nonsuppurative otitis media, unspecified ear 10/07/2015    [facility] (secretory otitis media)     GI issues IBS s/p endo and colonoscopy   2019 bad sinus infection and went to the eyes      Previous pulmonary history: bad pneumonia when she was a teenager.  She was not hospitalized at the time but she was off school for 1 month      Hospitalization History: Has not been hospitalized over the last year for breathing related problem.    Immunization  History:  Immunization History   Administered Date(s) Administered    COVID-19, mRNA, LNP-S, PF, 30 mcg/0.3 mL dose 02/18/2021, 03/10/2021, 09/26/2021    Flu vaccine (IIV4), preservative free *Check age/dose* 10/07/2015, 09/20/2016, 10/03/2017, 09/26/2021, 11/01/2022, 11/16/2023    Flu vaccine, quadrivalent, recombinant, preservative free, adult (FLUBLOK) 10/26/2019    Flu vaccine, trivalent, preservative free, age 6 months and greater (Fluarix/Fluzone/Flulaval) 09/11/2024    Influenza, Unspecified 09/26/2013    Influenza, injectable, quadrivalent 10/06/2020    MMR vaccine, subcutaneous (MMR II) 07/07/2011    Pfizer COVID-19 vaccine, 12 years and older, (30mcg/0.3mL) (Comirnaty) 11/16/2023, 10/04/2024    Pfizer COVID-19 vaccine, bivalent, age 12 years and older (30 mcg/0.3 mL) 12/15/2022    Pfizer Gray Cap SARS-CoV-2 05/21/2022       Family History:  Family History   Problem Relation Name Age of Onset    Breast cancer Mother 45     Other (bladder cancer) Brother      Colon cancer Maternal Grandfather      Ovarian cancer Mother's Sister 36        Social History:  Tobacco none but  smokes.  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    ergocalciferol (Vitamin D-2) 1.25 MG (57066 UT) capsule 1 capsule, Once Weekly    estradiol (ESTRACE) 1 g, 2 times weekly    fluticasone (Flonase) 50 mcg/actuation nasal spray 1 spray, Each  Nostril, Daily, Shake gently. Before first use, prime pump. After use, clean tip and replace cap.    fluticasone propion/salmeterol (ADVAIR HFA INHL) Inhale.    ipratropium (Atrovent) 42 mcg (0.06 %) nasal spray INHALE 2 PUFFS NASALLY 3 TIMES A DAY AS NEEDED    lidocaine (Lidoderm) 5 % patch 1 patch, transdermal, Daily, Remove & discard patch within 12 hours or as directed by MD.    miSOPROStoL (Cytotec) 200 mcg tablet 1 tablets 2 days prior to procedure at bedtime and then take 1 tabs the day before the procedure at bedtime.    polyethylene glycol (GLYCOLAX, MIRALAX) 17 g, Daily        Drug Allergies/Intolerances:  Allergies   Allergen Reactions    Metrogel [Metronidazole] Itching    Nitroimidazoles Itching, Rash and Unknown        Review of Systems:  All systems have been reviewed and are negative for findings pertinent to the chief complaint except as detailed below or described in the HPI.  Constitutional: Denies symptoms related to weight loss, fever or chills.  ENT: as per HPI  Cardiovascular: Denies symptoms related to chest pain, palpitations, edema and orthopnea.  Respiratory: as per HPI  Gastrointestinal: Denies symptoms related to nausea, vomiting, diarrhea or constipation. GERD   Genitourinary: Denies symptoms related to hematuria, nocturia, frequency or urgency.  Musculoskeletal: Denies symptoms related to arthritis, joint pain, leg pain, weakness or joint stiffness. Raynaud's  Integumentary: Denies symptoms related to skin rashes, moles, pigment changes or ulcers.  Neurological: Denies symptoms related to dizziness, syncope, seizures, tremors or paralysis.  Psychiatric: Denies psychiatric symptoms associated with PTSD, depression, anxiety, psychosis or hallucinations.  Endocrine: Denies endocrine symptoms related to diabetes, polyuria, and cold/heat intolerance.  Hematologic: Denies hematologic symptoms associated with anemia, bruising, bleeding or lymph node tenderness.  Allergic: Denies allergic  symptoms associated with allergy to meds, foods or contrast dye.  All other review of systems are negative and/or non-contributory.    Physical Examination:  /65   Pulse 71   Temp 36.1 °C (97 °F)   Wt 61.7 kg (136 lb)   SpO2 98%   BMI 21.95 kg/m²   General: ambulated independently; no acute distress; well-nourished; work of breathing was not increased; normal vocal character  HEENT: normocephalic; anicteric sclerae; conjunctivae not injected; nasal mucosa was unremarkable; oropharynx was clear without evidence of thrush; dentition was good.  Neck: supple; no lymphadenopathy or thyromegaly.  Chest: clear to auscultation bilaterally; no chest wall deformity.  Cardiac: regular rhythm; no gallop or murmur.  Abdomen: soft; non-tender; non-distended; no hepatosplenomegaly.  Extremities: no leg edema; no digital clubbing; 2+ pulses  Psychiatric: did not appear depressed or anxious.      Diagnostic testing       -PFTs I     -Imaging: I have personally visualized the most recent imaging and I agree with the radiologist interpretation   A cardiac score CT from 2021 shows no evidence of parenchymal finding and no evidence of bronchiectasis in the right lower lobe   A chest CT from May 2024 shows tree-in-bud nodularity and bronchiectasis in the right lower lobe.  A chest CT from July 30, 2024 shows improvement of these findings.    Chest CT 03/2025  No evidence of acute pulmonary embolism.   Moderate right lower lobe consolidative opacities.  Recommend follow-up   to resolution.   Mild right hilar lymphadenopathy likely reactive.     Sinus CT 02/2025  Progressive inflammatory changes in the right maxillary sinus.  Mild to   moderate improvement of the inflammatory changes in the left maxillary   sinus.  Progressive inflammatory changes in the ethmoid air cells.     Assessment and Recommendations:    Ms. Diaz is a 55 y.o. female non-smoker with recurrent sinusitis necessitating antibiotics and 2 pneumonias in the  last year confirmed by clinical history and chest imaging.  She also have mild right lower lobe bronchiectasis.  No sputum culture was ever performed.  She was treated with several courses of antibiotic.  Immune deficiency is highly suspected.  She is currently being worked up by allergy immunology.  We gave her a cup for sputum culture and gave her Aerobika for airway clearance  We will obtain a chest CT the end of April to ensure the resolution of the right lower lobe pneumonia  We will communicate with immunology our impression and follow up on their workup

## 2025-03-20 NOTE — PROGRESS NOTES
Patient instructed in use of oscillating positive pressure device for home use twice a day x 10-20 exhalations. Campbell cough post use. Patient cough was moist nonproductive with saliva sample discarded. Cleaning of device is also instructed.  Sputum sample cup and specimen bag with instructions left with patient by respiratory nursing staff.

## 2025-03-20 NOTE — PATIENT INSTRUCTIONS
Dear Kenyetta Diaz It was nice seeing you today. We discussed the following:     You have recurrent sinus infections and pneumonias.  You also have some bronchiectasis (abnormal dilatation of the breathing tubes in the right lower lobe of the lung.   I agree with immunology workup for immune deficiency  We need to repeat the chest CT the end of April to ensure the resolution of the right lower lobe pneumonia.  Please call the office for results  I would like to get a sputum culture  I also will give you a device to clear your secretions.  Please use it twice a day.  Instructions will be given today.  Return to clinic in June 2025    For scheduling purposes:    Call [phone] to schedule a breathing or a walking test     Call [phone] to schedule  EKG's, Echocardiograms and Cardiopulmonary Stress Tests.    Call [phone] to schedule Radiology tests such as Nuclear Medicine Stress Tests, CT Scans, and MRI's.    Should you have any questions Please Call my assistant Olivia Barrow at [phone] or our pulmonary nurse Gabby Monahan [phone].

## 2025-03-26 DIAGNOSIS — B99.9 RECURRENT INFECTIONS: Primary | ICD-10-CM

## 2025-04-03 ENCOUNTER — TELEPHONE (OUTPATIENT)
Dept: PULMONOLOGY | Facility: HOSPITAL | Age: 55
End: 2025-04-03
Payer: COMMERCIAL

## 2025-04-03 NOTE — TELEPHONE ENCOUNTER
Pt reached out via [employer] message regarding her sputum sample. Tried to return pt's call at [phone] regarding this matter, but was unsuccessful.  A detailed voicemail was left stating the order is still in the system and she can go to any  lab to get a new sample cup. Pt was also instructed to return the call at [phone] for any further questions or concerns. [employer] message was also sent with the attached requisition.

## 2025-04-28 ENCOUNTER — HOSPITAL ENCOUNTER (OUTPATIENT)
Dept: RADIOLOGY | Facility: HOSPITAL | Age: 55
Discharge: HOME | End: 2025-04-28
Payer: COMMERCIAL

## 2025-04-28 DIAGNOSIS — J47.9 BRONCHIECTASIS WITHOUT COMPLICATION (MULTI): ICD-10-CM

## 2025-04-28 PROCEDURE — 71250 CT THORAX DX C-: CPT | Performed by: RADIOLOGY

## 2025-04-28 PROCEDURE — 71250 CT THORAX DX C-: CPT

## 2025-04-29 LAB
BASOPHILS # BLD AUTO: 39 CELLS/UL (ref 0–200)
BASOPHILS NFR BLD AUTO: 0.7 %
EOSINOPHIL # BLD AUTO: 162 CELLS/UL (ref 15–500)
EOSINOPHIL NFR BLD AUTO: 2.9 %
ERYTHROCYTE [DISTWIDTH] IN BLOOD BY AUTOMATED COUNT: 13.7 % (ref 11–15)
HCT VFR BLD AUTO: 38.5 % (ref 35–45)
HGB BLD-MCNC: 12.4 G/DL (ref 11.7–15.5)
IGA SERPL-MCNC: 345 MG/DL (ref 47–310)
IGG SERPL-MCNC: 930 MG/DL (ref 600–1640)
IGM SERPL-MCNC: 61 MG/DL (ref 50–300)
LYMPHOCYTES # BLD AUTO: 2694 CELLS/UL (ref 850–3900)
LYMPHOCYTES NFR BLD AUTO: 48.1 %
MCH RBC QN AUTO: 28.7 PG (ref 27–33)
MCHC RBC AUTO-ENTMCNC: 32.2 G/DL (ref 32–36)
MCV RBC AUTO: 89.1 FL (ref 80–100)
MONOCYTES # BLD AUTO: 521 CELLS/UL (ref 200–950)
MONOCYTES NFR BLD AUTO: 9.3 %
NEUTROPHILS # BLD AUTO: 2184 CELLS/UL (ref 1500–7800)
NEUTROPHILS NFR BLD AUTO: 39 %
PLATELET # BLD AUTO: 265 THOUSAND/UL (ref 140–400)
PMV BLD REES-ECKER: 9 FL (ref 7.5–12.5)
RBC # BLD AUTO: 4.32 MILLION/UL (ref 3.8–5.1)
WBC # BLD AUTO: 5.6 THOUSAND/UL (ref 3.8–10.8)

## 2025-05-01 LAB
S PN DA SERO 19F IGG SER-MCNC: 0.7 UG/ML
S PNEUM DA 1 IGG SER-MCNC: 4.1 UG/ML
S PNEUM DA 10A IGG SER-MCNC: 3.9 UG/ML
S PNEUM DA 11A IGG SER-MCNC: 7.4 UG/ML
S PNEUM DA 12F IGG SER-MCNC: 0.4 UG/ML
S PNEUM DA 14 IGG SER-MCNC: >222
S PNEUM DA 15B IGG SER-MCNC: 3.6 UG/ML
S PNEUM DA 17F IGG SER-MCNC: 3.3 UG/ML
S PNEUM DA 18C IGG SER-MCNC: 1.3
S PNEUM DA 19A IGG SER-MCNC: 9 UG/ML
S PNEUM DA 2 IGG SER-MCNC: 4.2 UG/ML
S PNEUM DA 20A IGG SER-MCNC: 8.5 UG/ML
S PNEUM DA 22F IGG SER-MCNC: 0.4 UG/ML
S PNEUM DA 23F IGG SER-MCNC: 1.4 UG/ML
S PNEUM DA 3 IGG SER-MCNC: 11.5 UG/ML
S PNEUM DA 33F IGG SER-MCNC: 1.2 UG/ML
S PNEUM DA 4 IGG SER-MCNC: 4.9 UG/ML
S PNEUM DA 5 IGG SER-MCNC: 1.3 UG/ML
S PNEUM DA 6B IGG SER-MCNC: 1.7 UG/ML
S PNEUM DA 7F IGG SER-MCNC: 8.3 UG/ML
S PNEUM DA 8 IGG SER-MCNC: 2.7 UG/ML
S PNEUM DA 9N IGG SER-MCNC: 36.6 UG/ML
S PNEUM DA 9V IGG SER-MCNC: 2.3 UG/ML

## 2025-05-08 ENCOUNTER — TELEPHONE (OUTPATIENT)
Dept: PULMONOLOGY | Facility: HOSPITAL | Age: 55
End: 2025-05-08
Payer: COMMERCIAL

## 2025-05-08 NOTE — TELEPHONE ENCOUNTER
Pt reached out regarding CT results. Per Dr. Evangelista, CT looks great, pneumonia has resolved and lymph nodes are smaller. Tried to call pt at [phone] to update pt on the results, but was unsuccessful. Voicemail left with instructions to return the call at [phone]. [employer] message also sent with result findings.

## 2025-05-16 ENCOUNTER — TELEPHONE (OUTPATIENT)
Dept: PULMONOLOGY | Facility: HOSPITAL | Age: 55
End: 2025-05-16
Payer: COMMERCIAL

## 2025-05-16 NOTE — TELEPHONE ENCOUNTER
Spoke with pt regarding her CT results. Per Dr. Evangelista, CT looks great, pneumonia has resolved and lymph nodes are smaller. Pt stated that her PCP told her that the pneumonia was unresolved and wanted to give her an antibiotic. However, deferred treatment to pulmonary. Pt states that she still has the same cough that she has had for 2 years with beige off white mucous. Pt denies any other symptoms. Pt states that she just wants to feel better and doesn't want another hospital stay. Dr. Evangelista was notified.

## 2025-05-30 ENCOUNTER — TELEPHONE (OUTPATIENT)
Dept: PULMONOLOGY | Facility: HOSPITAL | Age: 55
End: 2025-05-30
Payer: COMMERCIAL

## 2025-05-30 NOTE — TELEPHONE ENCOUNTER
Left a VM re last CT results and patient persistent cough       === 04/28/25 ===    CT CHEST WO IV CONTRAST    - Impression -  1. Minimal benign residua of previously seen consolidation and  bronchiolitis in the basilar right lower lobe without discrete  measurable nodule or residual consolidation. Although nonspecific,  given appearance and distribution of the findings at the right base  on the prior study, the possibility of an aspiration event is raised.  2. Regression of previously seen mild mediastinal and right hilar  reactive lymphadenopathy.      Plan:  Update on Immunodeficiency workup   Sputum cx

## 2025-06-06 ENCOUNTER — TELEPHONE (OUTPATIENT)
Dept: NEUROSURGERY | Facility: CLINIC | Age: 55
End: 2025-06-06
Payer: COMMERCIAL

## 2025-06-09 ENCOUNTER — TELEPHONE (OUTPATIENT)
Dept: PULMONOLOGY | Facility: CLINIC | Age: 55
End: 2025-06-09
Payer: COMMERCIAL

## 2025-06-10 ENCOUNTER — OFFICE VISIT (OUTPATIENT)
Dept: PULMONOLOGY | Facility: CLINIC | Age: 55
End: 2025-06-10
Payer: COMMERCIAL

## 2025-06-10 VITALS
WEIGHT: 139 LBS | TEMPERATURE: 97.9 F | HEART RATE: 68 BPM | OXYGEN SATURATION: 98 % | HEIGHT: 66 IN | BODY MASS INDEX: 22.34 KG/M2 | SYSTOLIC BLOOD PRESSURE: 108 MMHG | DIASTOLIC BLOOD PRESSURE: 74 MMHG

## 2025-06-10 DIAGNOSIS — J47.9 BRONCHIECTASIS WITHOUT COMPLICATION (MULTI): ICD-10-CM

## 2025-06-10 PROCEDURE — 3008F BODY MASS INDEX DOCD: CPT | Performed by: INTERNAL MEDICINE

## 2025-06-10 PROCEDURE — 99212 OFFICE O/P EST SF 10 MIN: CPT | Performed by: INTERNAL MEDICINE

## 2025-06-10 PROCEDURE — 99215 OFFICE O/P EST HI 40 MIN: CPT | Performed by: INTERNAL MEDICINE

## 2025-06-10 RX ORDER — ALBUTEROL SULFATE 90 UG/1
2 INHALANT RESPIRATORY (INHALATION) EVERY 4 HOURS PRN
Qty: 18 G | Refills: 5 | Status: SHIPPED | OUTPATIENT
Start: 2025-06-10

## 2025-06-10 ASSESSMENT — PAIN SCALES - GENERAL: PAINLEVEL_OUTOF10: 0-NO PAIN

## 2025-06-10 NOTE — PATIENT INSTRUCTIONS
Dear Kenyetta Diaz It was nice seeing you today. We discussed the following:     You have recurrent sinus infections and pneumonias.  You also have some bronchiectasis (abnormal dilatation of the breathing tubes in the right lower lobe of the lung.   I agree with immunology workup for immune deficiency and allen get in touch with Dr Greenwood  Repeat a chest CT end of July and a visit afterwards  Follow up with ENT   I would like to get a sputum culture  Use albuterol twice a day and use the mucous clearing device afterwards and please obtain a sputum sample if able   I also will send test to screen for cystic fibrosis ( Gabby will call you with more details)    For scheduling purposes:    Call [phone] to schedule a breathing or a walking test     Call [phone] to schedule  EKG's, Echocardiograms and Cardiopulmonary Stress Tests.    Call [phone] to schedule Radiology tests such as Nuclear Medicine Stress Tests, CT Scans, and MRI's.    Should you have any questions Please Call my assistant Olivia Barrow at [phone] or our pulmonary nurse Gabby Monahan [phone].

## 2025-06-10 NOTE — PROGRESS NOTES
"Follow up recurrent pneumonias   Interval 6/10/2025   She is overall doing much better.  She continues however to have some cough none at night but mainly during the day.  Not very productive and she was unable to collect a sputum sample.  Occasional chest tightness.  She is not using any inhalers.  She is not using the Aerobika.  She completed the immunology workup but the notes from the immunology are not available for review  PE;  /74   Pulse 68   Temp 36.6 °C (97.9 °F)   Ht 1.676 m (5' 6\")   Wt 63 kg (139 lb)   SpO2 98% Comment: RA  BMI 22.44 kg/m²   Very faint crackles at the right base otherwise clear to auscultation no wheezing    Physician HPI (3/20/2025):    Kenyetta Diaz is a 55-year-old white female non-smoker with no exposure who has been suffering from recurrent sinusitis for the last 5 to 6 years.  She was evaluated by ENT on multiple occasions.  Over the last year patient had 2 pneumonias one in April 2024 treated as OP and a second one in March 2025 requiring admission and IV antibiotic.  Both pneumonias are in the right lower lobe.  A chest CT from May 2025 shows some mild groundglass nodules and TIB nodules in the right lower lobe and mild bronchiectasis.  These have gotten better on the July 2024 CT.  Her most recent chest CT from March 2025 shows right lower lobe infiltrate/consolidation consistent with her most recent pneumonia.  The patient was treated with azithromycin and ceftriaxone IV for 3 days and was discharged home on p.o. Omnicef and she completed an additional 4 days.  She feels much improved but she continues to have some cough productive of yellow sputum.  She also feels fatigued.  She also has right sided rib pain and back pain. She was recently evaluated by allergy immunology and she is having a immune deficiency workup.    Patient reports having an episode of severe pneumonia when she was a teenager for which she was home for about 1 month.  She was not hospitalized " or treated at home.  She does not regularly exercise for the last 4 to 5 years..  She is mMRC 1.  She also was evaluated at the [facility] for cough and had a methacholine challenge test that was reportedly negative.  She was prescribed ICS/LABA in  variable forms.  She stopped using everything most recently because she read the increased risk of pneumonia. With the use of ICS.  She only uses albuterol.  She suffersfrom GI issues/IBS.  She also suffers from reflux.  Outside of the pneumonia episode she denies any wheezing or chest tightness.    [facility]  Past Medical History:   Diagnosis Date    Acute streptococcal tonsillitis, unspecified 03/31/2019    Acute streptococcal tonsillitis    Mammographic microcalcification found on diagnostic imaging of breast 05/15/2018    Abnormal mammogram with microcalcification    Other conditions influencing health status     Gave birth to child recently    Other conditions influencing health status 03/23/2016    History of cough    Other specified symptoms and signs involving the digestive system and abdomen     Spastic pelvic floor syndrome    Pain in right lower leg 04/22/2016    Right calf pain    Personal history of other diseases of the circulatory system     History of supraventricular tachycardia    Personal history of other diseases of the female genital tract 01/08/2015    History of infertility    Personal history of other diseases of the nervous system and sense organs 02/02/2016    History of acute otitis media    Personal history of other diseases of the respiratory system 03/27/2018    History of acute sinusitis    Personal history of other diseases of the respiratory system 03/27/2018    History of acute bronchitis    Personal history of other diseases of the respiratory system 02/22/2016    History of acute sinusitis    Unspecified nonsuppurative otitis media, unspecified ear 10/07/2015    [facility] (secretory otitis media)     GI issues IBS s/p endo and colonoscopy    2019 bad sinus infection and went to the eyes      Previous pulmonary history: bad pneumonia when she was a teenager.  She was not hospitalized at the time but she was off school for 1 month      Hospitalization History: Has not been hospitalized over the last year for breathing related problem.    Immunization History:  Immunization History   Administered Date(s) Administered    COVID-19, mRNA, LNP-S, PF, 30 mcg/0.3 mL dose 02/18/2021, 03/10/2021, 09/26/2021    Flu vaccine (IIV4), preservative free *Check age/dose* 10/07/2015, 09/20/2016, 10/03/2017, 09/26/2021, 11/01/2022, 11/16/2023    Flu vaccine, quadrivalent, recombinant, preservative free, adult (FLUBLOK) 10/26/2019    Flu vaccine, trivalent, preservative free, age 6 months and greater (Fluarix/Fluzone/Flulaval) 09/11/2024    Influenza, Unspecified 09/26/2013    Influenza, injectable, quadrivalent 10/06/2020    MMR vaccine, subcutaneous (MMR II) 07/07/2011    Pfizer COVID-19 vaccine, 12 years and older, (30mcg/0.3mL) (Comirnaty) 11/16/2023, 10/04/2024    Pfizer COVID-19 vaccine, bivalent, age 12 years and older (30 mcg/0.3 mL) 12/15/2022    Pfizer Gray Cap SARS-CoV-2 05/21/2022       Family History:  Family History   Problem Relation Name Age of Onset    Breast cancer Mother 45     Other (bladder cancer) Brother      Colon cancer Maternal Grandfather      Ovarian cancer Mother's Sister 36        Social History:  Tobacco none but  smokes.  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    ergocalciferol (Vitamin D-2) 1.25 MG (52528 UT) capsule 1 capsule, Once Weekly    estradiol (ESTRACE) 1 g, 2 times weekly    fluticasone (Flonase) 50 mcg/actuation nasal spray 1 spray, Each Nostril, Daily, Shake gently. Before first use, prime pump. After use, clean tip and replace cap.    fluticasone propion/salmeterol (ADVAIR HFA INHL) Inhale.    ipratropium (Atrovent) 42 mcg (0.06 %) nasal spray INHALE 2 PUFFS NASALLY 3 TIMES A DAY AS NEEDED    lidocaine (Lidoderm) 5 % patch 1 patch, transdermal, Daily, Remove & discard patch within 12 hours or as directed by MD.    miSOPROStoL (Cytotec) 200 mcg tablet 1 tablets 2 days prior to procedure at bedtime and then take 1 tabs the day before the procedure at bedtime.    polyethylene glycol (GLYCOLAX, MIRALAX) 17 g, Daily        Drug Allergies/Intolerances:  Allergies   Allergen Reactions    Metrogel [Metronidazole] Itching    Nitroimidazoles Itching, Rash and Unknown          Diagnostic testing   -PFTs and MCT   from the [facility] 2023        -Imaging: I have personally visualized the most recent imaging and I agree with the radiologist interpretation   A cardiac score CT from 2021 shows no evidence of parenchymal finding and no evidence of bronchiectasis in the right lower lobe   A chest CT from May 2024 shows tree-in-bud nodularity and bronchiectasis in the right lower lobe.  A chest CT from July 30, 2024 shows improvement of these findings.    Chest CT 03/2025  No evidence of acute pulmonary embolism.   Moderate right lower lobe consolidative opacities.  Recommend follow-up   to resolution.   Mild right hilar lymphadenopathy likely reactive.     === 04/28/25 ===    CT CHEST WO IV CONTRAST    - Impression -  1. Minimal benign residual of previously seen consolidation and bronchiolitis in the basilar right lower lobe without  discrete  measurable nodule or residual consolidation. 2. Regression of previously seen mild mediastinal and right hilar  reactive lymphadenopathy.      Sinus CT 02/2025  Progressive inflammatory changes in the right maxillary sinus.  Mild to   moderate improvement of the inflammatory changes in the left maxillary   sinus.  Progressive inflammatory changes in the ethmoid air cells.     Assessment and Recommendations:    Ms. Diaz is a 55 y.o. female non-smoker with recurrent sinusitis necessitating antibiotics and 2 pneumonias in the last year confirmed by clinical history and chest imaging.  She also have mild right lower lobe bronchiectasis.  No sputum culture was ever performed.  She was treated with several courses of antibiotic.  Immune deficiency is highly suspected.  She is currently being worked up by allergy immunology, notes not available in the chart    We gave her a cup for sputum culture and gave her Aerobika for airway clearance  Chest CT the end of April shows dramatic improvement almost near complete resolution of the right lower lobe pneumonia-- repeat end if August   We will communicate with immunology and follow up on their workup   Send CF screening sweat chloride  Follow up with ENT   Start albuterol before aerobika

## 2025-06-11 DIAGNOSIS — B99.9 RECURRENT INFECTIONS: Primary | ICD-10-CM

## 2025-07-28 ENCOUNTER — HOSPITAL ENCOUNTER (OUTPATIENT)
Dept: RADIOLOGY | Facility: HOSPITAL | Age: 55
Discharge: HOME | End: 2025-07-28
Payer: COMMERCIAL

## 2025-07-28 DIAGNOSIS — J47.9 BRONCHIECTASIS WITHOUT COMPLICATION (MULTI): ICD-10-CM

## 2025-07-28 PROCEDURE — 71250 CT THORAX DX C-: CPT

## 2025-07-28 PROCEDURE — 71250 CT THORAX DX C-: CPT | Performed by: RADIOLOGY

## 2025-08-04 ENCOUNTER — APPOINTMENT (OUTPATIENT)
Dept: PRIMARY CARE | Facility: CLINIC | Age: 55
End: 2025-08-04
Payer: COMMERCIAL

## 2025-08-07 ENCOUNTER — OFFICE VISIT (OUTPATIENT)
Dept: PULMONOLOGY | Facility: HOSPITAL | Age: 55
End: 2025-08-07
Payer: COMMERCIAL

## 2025-08-07 VITALS
HEART RATE: 63 BPM | DIASTOLIC BLOOD PRESSURE: 72 MMHG | SYSTOLIC BLOOD PRESSURE: 108 MMHG | BODY MASS INDEX: 22.76 KG/M2 | WEIGHT: 141 LBS | OXYGEN SATURATION: 98 % | TEMPERATURE: 97.6 F

## 2025-08-07 DIAGNOSIS — J47.9 BRONCHIECTASIS WITHOUT COMPLICATION (MULTI): ICD-10-CM

## 2025-08-07 PROCEDURE — 99213 OFFICE O/P EST LOW 20 MIN: CPT | Performed by: INTERNAL MEDICINE

## 2025-08-07 PROCEDURE — 1036F TOBACCO NON-USER: CPT | Performed by: INTERNAL MEDICINE

## 2025-08-07 ASSESSMENT — LIFESTYLE VARIABLES
SKIP TO QUESTIONS 9-10: 0
HOW OFTEN DO YOU HAVE SIX OR MORE DRINKS ON ONE OCCASION: LESS THAN MONTHLY
HOW OFTEN DO YOU HAVE A DRINK CONTAINING ALCOHOL: MONTHLY OR LESS
AUDIT-C TOTAL SCORE: 2
HOW MANY STANDARD DRINKS CONTAINING ALCOHOL DO YOU HAVE ON A TYPICAL DAY: 1 OR 2

## 2025-08-07 ASSESSMENT — PAIN SCALES - GENERAL: PAINLEVEL_OUTOF10: 0-NO PAIN

## 2025-08-07 NOTE — PATIENT INSTRUCTIONS
Dear Kenyetta Diaz It was nice seeing you today. We discussed the following:     Your CAT scan showed complete resolution of the pneumonia.  Return to this clinic on as-needed basis   Update me with allergy immunology recommendation   for scheduling purposes:    Call [phone] to schedule a breathing or a walking test     Call [phone] to schedule  EKG's, Echocardiograms and Cardiopulmonary Stress Tests.    Call [phone] to schedule Radiology tests such as Nuclear Medicine Stress Tests, CT Scans, and MRI's.    Should you have any questions Please Call my assistant Olivia Barrow at [phone] or our pulmonary nurse Gabby Monahan [phone].

## 2025-08-07 NOTE — PROGRESS NOTES
"Follow up recurrent pneumonias     Interval 8/7/2025  She is overall doing great. Not very active but planning on becoming more active   No other symptoms no sputum and thus culture not performed     PE:  /72   Pulse 63   Temp 36.4 °C (97.6 °F) (Temporal)   Wt 64 kg (141 lb)   SpO2 98%   BMI 22.76 kg/m²     Interval 6/10/2025   She is overall doing much better.  She continues however to have some cough none at night but mainly during the day.  Not very productive and she was unable to collect a sputum sample.  Occasional chest tightness.  She is not using any inhalers.  She is not using the Aerobika.  She completed the immunology workup but the notes from the immunology are not available for review  PE;  /74   Pulse 68   Temp 36.6 °C (97.9 °F)   Ht 1.676 m (5' 6\")   Wt 63 kg (139 lb)   SpO2 98% Comment: RA  BMI 22.44 kg/m²   Very faint crackles at the right base otherwise clear to auscultation no wheezing    Physician HPI (3/20/2025):    Kenyetta Diaz is a 55-year-old white female non-smoker with no exposure who has been suffering from recurrent sinusitis for the last 5 to 6 years.  She was evaluated by ENT on multiple occasions.  Over the last year patient had 2 pneumonias one in April 2024 treated as OP and a second one in March 2025 requiring admission and IV antibiotic.  Both pneumonias are in the right lower lobe.  A chest CT from May 2025 shows some mild groundglass nodules and TIB nodules in the right lower lobe and mild bronchiectasis.  These have gotten better on the July 2024 CT.  Her most recent chest CT from March 2025 shows right lower lobe infiltrate/consolidation consistent with her most recent pneumonia.  The patient was treated with azithromycin and ceftriaxone IV for 3 days and was discharged home on p.o. Omnicef and she completed an additional 4 days.  She feels much improved but she continues to have some cough productive of yellow sputum.  She also feels fatigued.  She " also has right sided rib pain and back pain. She was recently evaluated by allergy immunology and she is having a immune deficiency workup.    Patient reports having an episode of severe pneumonia when she was a teenager for which she was home for about 1 month.  She was not hospitalized or treated at home.  She does not regularly exercise for the last 4 to 5 years..  She is mMRC 1.  She also was evaluated at the [facility] for cough and had a methacholine challenge test that was reportedly negative.  She was prescribed ICS/LABA in  variable forms.  She stopped using everything most recently because she read the increased risk of pneumonia. With the use of ICS.  She only uses albuterol.  She suffersfrom GI issues/IBS.  She also suffers from reflux.  Outside of the pneumonia episode she denies any wheezing or chest tightness.    [facility]  Past Medical History:   Diagnosis Date    Acute streptococcal tonsillitis, unspecified 03/31/2019    Acute streptococcal tonsillitis    Mammographic microcalcification found on diagnostic imaging of breast 05/15/2018    Abnormal mammogram with microcalcification    Other conditions influencing health status     Gave birth to child recently    Other conditions influencing health status 03/23/2016    History of cough    Other specified symptoms and signs involving the digestive system and abdomen     Spastic pelvic floor syndrome    Pain in right lower leg 04/22/2016    Right calf pain    Personal history of other diseases of the circulatory system     History of supraventricular tachycardia    Personal history of other diseases of the female genital tract 01/08/2015    History of infertility    Personal history of other diseases of the nervous system and sense organs 02/02/2016    History of acute otitis media    Personal history of other diseases of the respiratory system 03/27/2018    History of acute sinusitis    Personal history of other diseases of the respiratory system  03/27/2018    History of acute bronchitis    Personal history of other diseases of the respiratory system 02/22/2016    History of acute sinusitis    Unspecified nonsuppurative otitis media, unspecified ear 10/07/2015    [facility] (secretory otitis media)     GI issues IBS s/p endo and colonoscopy   2019 bad sinus infection and went to the eyes      Previous pulmonary history: bad pneumonia when she was a teenager.  She was not hospitalized at the time but she was off school for 1 month      Hospitalization History: Has not been hospitalized over the last year for breathing related problem.    Immunization History:  Immunization History   Administered Date(s) Administered    COVID-19, mRNA, LNP-S, PF, 30 mcg/0.3 mL dose 02/18/2021, 03/10/2021, 09/26/2021    Flu vaccine (IIV4), preservative free *Check age/dose* 10/07/2015, 09/20/2016, 10/03/2017, 09/26/2021, 11/01/2022, 11/16/2023    Flu vaccine, quadrivalent, recombinant, preservative free, adult (FLUBLOK) 10/26/2019    Flu vaccine, trivalent, preservative free, age 6 months and greater (Fluarix/Fluzone/Flulaval) 09/11/2024    Influenza, Unspecified 09/26/2013    Influenza, injectable, quadrivalent 10/06/2020    MMR vaccine, subcutaneous (MMR II) 07/07/2011    Pfizer COVID-19 vaccine, 12 years and older, (30mcg/0.3mL) (Comirnaty) 11/16/2023, 10/04/2024    Pfizer COVID-19 vaccine, bivalent, age 12 years and older (30 mcg/0.3 mL) 12/15/2022    Pfizer Gray Cap SARS-CoV-2 05/21/2022       Family History:  Family History   Problem Relation Name Age of Onset    Breast cancer Mother 45     Other (bladder cancer) Brother      Colon cancer Maternal Grandfather      Ovarian cancer Mother's Sister 36        Social History:  Tobacco none but  smokes.  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    ergocalciferol (Vitamin D-2) 1.25 MG (06997 UT) capsule 1 capsule, Once Weekly    estradiol (ESTRACE) 1 g, 2 times weekly    fluticasone (Flonase) 50 mcg/actuation nasal spray 1 spray, Each Nostril, Daily, Shake gently. Before first use, prime pump. After use, clean tip and replace cap.    fluticasone propion/salmeterol (ADVAIR HFA INHL) Inhale.    ipratropium (Atrovent) 42 mcg (0.06 %) nasal spray INHALE 2 PUFFS NASALLY 3 TIMES A DAY AS NEEDED    lidocaine (Lidoderm) 5 % patch 1 patch, transdermal, Daily, Remove & discard patch within 12 hours or as directed by MD.    miSOPROStoL (Cytotec) 200 mcg tablet 1 tablets 2 days prior to procedure at bedtime and then take 1 tabs the day before the procedure at bedtime.    polyethylene glycol (GLYCOLAX, MIRALAX) 17 g, Daily        Drug Allergies/Intolerances:  Allergies   Allergen Reactions    Metrogel [Metronidazole] Itching    Nitroimidazoles Itching, Rash and Unknown          Diagnostic testing   -PFTs and MCT   from [facility] 2023        -Imaging: I have personally visualized the most recent imaging and I agree with the radiologist interpretation   A cardiac score CT from 2021 shows no evidence of parenchymal finding and no evidence of bronchiectasis in the right lower lobe   A chest CT from May 2024 shows tree-in-bud nodularity and bronchiectasis in the right lower lobe.  A chest CT from July 30, 2024 shows improvement of these findings.    Chest CT 03/2025  No evidence of acute pulmonary embolism.   Moderate right lower lobe consolidative  opacities.  Recommend follow-up   to resolution.   Mild right hilar lymphadenopathy likely reactive.     === 04/28/25 ===    CT CHEST WO IV CONTRAST    - Impression -  1. Minimal benign residual of previously seen consolidation and bronchiolitis in the basilar right lower lobe without discrete  measurable nodule or residual consolidation. 2. Regression of previously seen mild mediastinal and right hilar  reactive lymphadenopathy.        === 07/28/25 CHEST WO IV CONTRAST    - Impression -  1.  Complete interval resolution of previously visualized tree-in-bud  nodularity seen in the right lower lobe on prior exam dated  04/28/2025. No acute process within the chest on current exam.  2. A healing fracture deformity of the anterolateral right 7th rib is  again visualized. Correlate with history of recent trauma.  3. Mild coronary artery calcifications.      Sinus CT 02/2025  Progressive inflammatory changes in the right maxillary sinus.  Mild to   moderate improvement of the inflammatory changes in the left maxillary   sinus.  Progressive inflammatory changes in the ethmoid air cells.     Assessment and Recommendations:    Ms. Diaz is a 55 y.o. female non-smoker with recurrent sinusitis necessitating antibiotics and 2 pneumonias in the last year confirmed by clinical history and chest imaging.  She also have ? mild right lower lobe bronchiectasis.  Sputum culture was never performed and currently unable to produce sputum   She was treated with several courses of antibiotic.  Immune deficiency is being ruled out and she is being worked up by allergy immunology.  Her most recent CT shows complete resolution of the right lower lobe pneumonia.  She was reassured.  She will return to this clinic on as-needed basis

## 2025-10-14 ENCOUNTER — APPOINTMENT (OUTPATIENT)
Dept: OTOLARYNGOLOGY | Facility: CLINIC | Age: 55
End: 2025-10-14
Payer: COMMERCIAL